# Patient Record
Sex: FEMALE | Race: OTHER | URBAN - METROPOLITAN AREA
[De-identification: names, ages, dates, MRNs, and addresses within clinical notes are randomized per-mention and may not be internally consistent; named-entity substitution may affect disease eponyms.]

---

## 2020-07-24 NOTE — PATIENT DISCUSSION
8 WEEKS POST PARTUM - WAIT UNTIL 3 MOS POST PARTUM FOR LASIK SX. THEREFORE, RTC FOR REPEAT CONSULT (TO DETERMINE STABILITY). UNDERSTANDS AS RELAXIN/ESTROGEN LEVELS DECREASE VISION MAY FLUCTUATE. REPORTS STABLE RX &gt; 3+ YEARS THROUGH BOTH PREGNANCIES.

## 2020-07-24 NOTE — PATIENT DISCUSSION
PATIENT CURRENTLY BREASTFEEDING -  DISC SMALL RISK OF TOPICAL DROPS ENTERING BLOODSTREAM. PT GIVEN OPTION TO DISC W/ OBGYN/PEDIATRICIAN PER HER DISCRETION.

## 2020-11-03 NOTE — PATIENT DISCUSSION
New Prescription: Zymaxid (gatifloxacin): drops: 0.5% 1 drop four times a day as directed into both eyes 11-

## 2020-11-03 NOTE — PATIENT DISCUSSION
New Prescription: prednisolone acetate (prednisolone acetate): drops,suspension: 1% 1 drop four times a day as directed into both eyes 11-

## 2020-11-12 NOTE — PATIENT DISCUSSION
Continue: prednisolone acetate (prednisolone acetate): drops,suspension: 1% 1 drop four times a day as directed into both eyes 11-

## 2020-11-12 NOTE — PATIENT DISCUSSION
DISC W PT THAT THE NEED FOR READING GLASSES USUALLY OCCURS AT AGE [age]. DISC EVEN POST LASIK, READING GLASSES WILL BE NEEDED WHEN THE TIME COMES.

## 2020-11-12 NOTE — PATIENT DISCUSSION
Continue: Zymaxid (gatifloxacin): drops: 0.5% 1 drop four times a day as directed into both eyes 11-

## 2022-03-29 ENCOUNTER — NEW PATIENT (OUTPATIENT)
Dept: URBAN - METROPOLITAN AREA CLINIC 9 | Facility: CLINIC | Age: 67
End: 2022-03-29

## 2022-03-29 DIAGNOSIS — H25.13: ICD-10-CM

## 2022-03-29 PROCEDURE — 99204 OFFICE O/P NEW MOD 45 MIN: CPT

## 2022-03-29 PROCEDURE — 92015 DETERMINE REFRACTIVE STATE: CPT

## 2022-03-29 PROCEDURE — 92136 OPHTHALMIC BIOMETRY: CPT

## 2022-03-29 ASSESSMENT — VISUAL ACUITY
OS_BCVA: 20/40
OS_SC: J7
OD_SC: 20/60
OS_PH: 20/40
OD_BCVA: 20/40
OD_SC: J3
OD_PH: 20/40
OS_SC: 20/60

## 2022-03-29 ASSESSMENT — TONOMETRY
OD_IOP_MMHG: 11
OS_IOP_MMHG: 13

## 2022-03-29 ASSESSMENT — KERATOMETRY
OS_K1POWER_DIOPTERS: 44.00
OD_K1POWER_DIOPTERS: 44.00
OS_K2POWER_DIOPTERS: 45.00
OS_AXISANGLE2_DEGREES: 163
OD_K2POWER_DIOPTERS: 44.75
OD_AXISANGLE_DEGREES: 106
OD_AXISANGLE2_DEGREES: 16
OS_AXISANGLE_DEGREES: 73

## 2022-03-29 NOTE — PATIENT DISCUSSION
MONOVISION: Monovision - Patient uses one eye to see at distance and the other eye to see at near. The patient states they have been a successful monovision patient or must demonstrate a successful mono-vision contact lens trial prior to cataract surgery. It was discussed that monovision is a compromise and vision will be limited during activities that require good depth perception to include driving at night and near tasks. The patient was informed that the range of focus at near with mono-vision after cataract surgery will be smaller due to the smaller depth of field with an artificial lens when compared to their natural lens even in the setting of a cataract. It was also explained that the distance vision in the near eye may be blurrier when compared to the distance vision in the near eye with a natural lens. As such, a small percentage of successful monovision patients prior to surgery may experience difficulty with monovision with an artificial lens implant after surgery possibly requiring glasses and/or contacts or an IOL exchange.

## 2022-03-29 NOTE — PATIENT DISCUSSION
Discussed the Light Adjustable Lens (JOY) with the patient. Told this lens gives the most flexibility regarding postoperative refractive outcome as it allows a non-surgical modification of the refractive error leading to less dependence on glasses. Patient made aware that this may require several visits and that the results depend on wearing UV protective glasses for about 3-5 weeks after surgery. Patient informed that the JOY may not be implanted on some occasions and need to be substituted by another lens if the pupil is found not to adequately dilate on the day of surgery. Patient informed that this is an elective out of pocket option not covered by payors. Also clearly discussed that there is no need to go with this option if wearing corrective glasses is acceptable.

## 2022-04-11 ENCOUNTER — ESTABLISHED PATIENT (OUTPATIENT)
Dept: URBAN - METROPOLITAN AREA CLINIC 14 | Facility: CLINIC | Age: 67
End: 2022-04-11

## 2022-04-11 DIAGNOSIS — H25.13: ICD-10-CM

## 2022-04-11 PROCEDURE — 99199ADVT ADVANCED VISION TESTING

## 2022-04-11 ASSESSMENT — KERATOMETRY
OD_K2POWER_DIOPTERS: 44.75
OD_AXISANGLE_DEGREES: 106
OS_AXISANGLE_DEGREES: 73
OD_K1POWER_DIOPTERS: 44.00
OS_K2POWER_DIOPTERS: 45.00
OS_K1POWER_DIOPTERS: 44.00
OS_AXISANGLE2_DEGREES: 163
OD_AXISANGLE2_DEGREES: 16

## 2022-04-11 NOTE — PATIENT DISCUSSION
Myopic Lasik. Discussed with the patient the predictability of visual outcome after cataract surgery is more difficult in previous refractive surgery patients. The patient is at greater risk for the need of enhancement to achieve desired visual outcome.

## 2022-06-08 ENCOUNTER — POST-OP (OUTPATIENT)
Dept: URBAN - METROPOLITAN AREA CLINIC 14 | Facility: CLINIC | Age: 67
End: 2022-06-08

## 2022-06-08 DIAGNOSIS — Z96.1: ICD-10-CM

## 2022-06-08 DIAGNOSIS — H25.11: ICD-10-CM

## 2022-06-08 PROCEDURE — 92136 OPHTHALMIC BIOMETRY: CPT

## 2022-06-08 PROCEDURE — 99024 POSTOP FOLLOW-UP VISIT: CPT

## 2022-06-08 ASSESSMENT — KERATOMETRY
OD_K2POWER_DIOPTERS: 44.75
OS_AXISANGLE_DEGREES: 73
OS_K2POWER_DIOPTERS: 45.00
OD_AXISANGLE_DEGREES: 106
OD_K1POWER_DIOPTERS: 44.00
OS_K1POWER_DIOPTERS: 44.00
OS_AXISANGLE2_DEGREES: 163
OD_AXISANGLE2_DEGREES: 16

## 2022-06-08 ASSESSMENT — VISUAL ACUITY
OS_PH: 20/70
OD_BCVA: 20/40
OS_SC: 20/200

## 2022-06-08 ASSESSMENT — TONOMETRY
OS_IOP_MMHG: 12
OD_IOP_MMHG: 16

## 2022-06-08 NOTE — PATIENT DISCUSSION
Pt reports flashing, retina is clear, nerve is healthy and pink. Start tears to help improve clarity through the cornea.

## 2022-06-08 NOTE — PATIENT DISCUSSION
Good postoperative appearance. Ok to hold off on surgery in the fellow eye if pt is not confident about proceeding at this time.

## 2022-06-13 ENCOUNTER — POST-OP (OUTPATIENT)
Dept: URBAN - METROPOLITAN AREA CLINIC 14 | Facility: CLINIC | Age: 67
End: 2022-06-13

## 2022-06-13 DIAGNOSIS — Z96.1: ICD-10-CM

## 2022-06-13 PROCEDURE — 99024 POSTOP FOLLOW-UP VISIT: CPT

## 2022-06-13 ASSESSMENT — VISUAL ACUITY
OD_PH: 20/40
OD_BCVA: 20/30
OD_SC: 20/60
OS_PH: 20/60
OS_SC: 20/100
OS_BCVA: 20/50

## 2022-06-13 NOTE — PATIENT DISCUSSION
DISCUSSED LUBRICATING OS AND HOLD OFF ON SURGERY FOR NOW. FOLLOW UP IN 2 WEEKS. DISCUSSED IF VISION DOES NOT IMPROVE DISCUSSED POSSIBLE IOL EXCHANGE FOR DISTANCE ONLY IOL.

## 2022-06-28 RX ORDER — LORAZEPAM 0.5 MG/1
TABLET ORAL
COMMUNITY
Start: 2022-04-19 | End: 2022-08-01 | Stop reason: SDUPTHER

## 2022-06-28 RX ORDER — PAROXETINE HYDROCHLORIDE 20 MG/1
TABLET, FILM COATED ORAL
COMMUNITY
Start: 2022-04-19 | End: 2022-09-13

## 2022-07-11 ENCOUNTER — POST-OP (OUTPATIENT)
Dept: URBAN - METROPOLITAN AREA CLINIC 14 | Facility: CLINIC | Age: 67
End: 2022-07-11

## 2022-07-11 DIAGNOSIS — Z96.1: ICD-10-CM

## 2022-07-11 PROCEDURE — 99024 POSTOP FOLLOW-UP VISIT: CPT

## 2022-07-11 ASSESSMENT — TONOMETRY
OD_IOP_MMHG: 13
OS_IOP_MMHG: 13

## 2022-07-11 ASSESSMENT — VISUAL ACUITY
OS_BCVA: 20/40
OD_SC: 20/50-2
OD_BCVA: 20/30
OS_SC: 20/100+1

## 2022-07-11 NOTE — PATIENT DISCUSSION
Vision gradually improving with time and lubrication. Discussed with the patient that the predictability of visual outcome after cataract surgery is more difficult in previous refractive surgery patients. The patient is at greater risk for the need of a lasik enhancement to achieve desired visual outcome. Pts vision did not improve with a -1.00 SCL OS in consideration of a lasik enhancement although pt reports minimal improvement. Vision is best corrected to 20/40.  Ruled out macular pathology on OCT Mac today.  IOL intolerance is likely the cause of her vision limitation. Recommend IOL exchange with a single vision IOL for distance. Pt would like to think about it and follow up in 2 weeks.

## 2022-07-27 ENCOUNTER — POST-OP (OUTPATIENT)
Dept: URBAN - METROPOLITAN AREA CLINIC 14 | Facility: CLINIC | Age: 67
End: 2022-07-27

## 2022-07-27 DIAGNOSIS — Z98.890: ICD-10-CM

## 2022-07-27 DIAGNOSIS — T85.29XA: ICD-10-CM

## 2022-07-27 DIAGNOSIS — Z96.1: ICD-10-CM

## 2022-07-27 DIAGNOSIS — H25.11: ICD-10-CM

## 2022-07-27 PROCEDURE — 99024 POSTOP FOLLOW-UP VISIT: CPT

## 2022-07-27 ASSESSMENT — VISUAL ACUITY
OD_SC: 20/40
OS_SC: 20/100
OS_PH: 20/60

## 2022-07-27 NOTE — PATIENT DISCUSSION
Placed -1.00 CL in OD, Patient to return next week for VA check and discussion. Patient demonstrated with purple glasses the near vision she would loose with an IOL exchange. Discussed RBA's of IOL exchange in the future.

## 2022-07-27 NOTE — PATIENT DISCUSSION
In addition to discussion of IOL exchange, the risks, benefits, alternatives and possible complications of vitrectomy/pars plana vitrectomy surgery were discussed in detail. The patient understands that it is not possible to anticipate all possible outcomes or complications from the surgery. Some potential risks include (but are not limited to): retinal detachment, glaucoma, choroidal detachment, pain, macular hole, decreased vision, loss of vision, and need for more surgery. The patient also understands that no guarantees can be made regarding the success of the planned surgery.

## 2022-07-29 PROBLEM — M16.11 PRIMARY OSTEOARTHRITIS OF RIGHT HIP: Status: ACTIVE | Noted: 2022-07-29

## 2022-07-29 PROBLEM — H26.9 CATARACT OF BOTH EYES: Status: ACTIVE | Noted: 2022-07-29

## 2022-07-29 PROBLEM — H93.13 TINNITUS OF BOTH EARS: Status: ACTIVE | Noted: 2022-07-29

## 2022-07-29 PROBLEM — I73.9 PVD (PERIPHERAL VASCULAR DISEASE) (HCC): Status: ACTIVE | Noted: 2022-07-29

## 2022-07-29 PROBLEM — H43.813 PVD (POSTERIOR VITREOUS DETACHMENT), BOTH EYES: Status: ACTIVE | Noted: 2022-07-29

## 2022-07-29 PROBLEM — E66.9 OBESITY (BMI 30.0-34.9): Status: ACTIVE | Noted: 2022-07-29

## 2022-07-29 PROBLEM — M19.90 OSTEOARTHRITIS: Status: ACTIVE | Noted: 2022-07-29

## 2022-07-29 PROBLEM — F41.9 ANXIETY: Status: ACTIVE | Noted: 2022-07-29

## 2022-07-29 PROBLEM — E78.5 HYPERLIPIDEMIA: Status: ACTIVE | Noted: 2022-07-29

## 2022-07-29 PROBLEM — G43.909 MIGRAINE WITHOUT STATUS MIGRAINOSUS, NOT INTRACTABLE: Status: ACTIVE | Noted: 2022-07-29

## 2022-08-10 ENCOUNTER — POST-OP (OUTPATIENT)
Dept: URBAN - METROPOLITAN AREA CLINIC 14 | Facility: CLINIC | Age: 67
End: 2022-08-10

## 2022-08-10 DIAGNOSIS — Z96.1: ICD-10-CM

## 2022-08-10 DIAGNOSIS — T85.29XD: ICD-10-CM

## 2022-08-10 PROCEDURE — 99024 POSTOP FOLLOW-UP VISIT: CPT

## 2022-08-10 ASSESSMENT — VISUAL ACUITY
OS_BCVA: 20/30-2
OS_PH: 20/60
OD_PH: 20/50
OD_SC: 20/70
OS_SC: 20/80

## 2022-08-10 ASSESSMENT — TONOMETRY
OS_IOP_MMHG: 14
OD_IOP_MMHG: 14

## 2022-08-10 NOTE — PATIENT DISCUSSION
Patient did not tolerate the CL in OS and took it out after 2 days of wearing it. She states she did not notice much improvement with the CL before she took it out. Patient is previous MV Lasik, OD near and OS distance. Mac OCT done today was normal. German done today showed irregularities from her Lasik. It was explained to the patient that the irregular astigmatism is causing the blurred VA, not the Vivity IOL and exchanging the IOL would not fix the blurred [place]. Hard CL over refraction done today and pt corrected to 20/30-2. Patient to return to see Dr. Monika Marie for Scleral/Hard CL fit and patient is to wear it for a day and if patient likes the vision in the Scleral/HCL then plan to work her up for german guided Chidi Henriquez.

## 2022-08-15 ENCOUNTER — POST-OP (OUTPATIENT)
Dept: URBAN - METROPOLITAN AREA CLINIC 14 | Facility: CLINIC | Age: 67
End: 2022-08-15

## 2022-08-15 DIAGNOSIS — Z96.1: ICD-10-CM

## 2022-08-15 DIAGNOSIS — T85.29XD: ICD-10-CM

## 2022-08-15 PROCEDURE — 99024 POSTOP FOLLOW-UP VISIT: CPT

## 2022-08-15 ASSESSMENT — VISUAL ACUITY
OS_SC: 20/80
OS_BCVA: 20/50

## 2022-08-15 NOTE — PATIENT DISCUSSION
Patient did not tolerate the CL in OS and took it out after 2 days of wearing it. She states she did not notice much improvement with the CL before she took it out. Patient is previous MV Lasik, OD near and OS distance. Mac OCT done today was normal. German done today showed irregularities from her Lasik. It was explained to the patient that the irregular astigmatism is causing the blurred VA, not the Vivity IOL and exchanging the IOL would not fix the blurred [place]. Hard CL over refraction done today and pt corrected to 20/30-2. Patient to return to see Dr. James Martinez for Scleral/Hard CL fit and patient is to wear it for a day and if patient likes the vision in the Scleral/HCL then plan to work her up for german guided Chidi Henriquez.

## 2022-08-15 NOTE — PATIENT DISCUSSION
RTC 1 day to see if complaints of near doubling resolved. Double resolved with +1.50 loose lens over OS, maintaining BCDVA OU to 20/40 OU.

## 2022-08-16 ENCOUNTER — POST-OP (OUTPATIENT)
Dept: URBAN - METROPOLITAN AREA CLINIC 14 | Facility: CLINIC | Age: 67
End: 2022-08-16

## 2022-08-16 DIAGNOSIS — Z96.1: ICD-10-CM

## 2022-08-16 DIAGNOSIS — T85.29XD: ICD-10-CM

## 2022-08-16 PROCEDURE — 99024 POSTOP FOLLOW-UP VISIT: CPT

## 2022-08-16 ASSESSMENT — VISUAL ACUITY
OD_SC: 20/70
OS_SC: J16
OS_CC: 20/80-2

## 2022-08-16 NOTE — PATIENT DISCUSSION
Doubling unresolved. Per KDS, [place] [address] OS or lens exchange OS still an option. Per KDS, lens exchange potential even after PRK. After extensive conversation, patient wishes to wait and think about her options.

## 2022-09-13 PROBLEM — I73.9 PERIPHERAL VASCULAR DISEASE (HCC): Status: ACTIVE | Noted: 2022-09-13

## 2022-09-13 PROBLEM — R29.898 SHOULDER WEAKNESS: Status: ACTIVE | Noted: 2022-09-13

## 2022-09-13 PROBLEM — M19.90 OSTEOARTHRITIS: Status: ACTIVE | Noted: 2022-09-13

## 2024-10-04 NOTE — PATIENT DISCUSSION
LARGE PUPILS - PATIENT WAS ADVISED OF THE INCREASED RISK OF NIGHT VISION SYMPTOMS ASSOCIATED WITH LARGER PUPILS. I DISCUSSED IN DETAIL THE POSSIBILITY OF GLARE, HALOS, AND STARBURSTS AROUND LIGHTS AT NIGHT THAT MAY AFFECT VISION. PATIENT WAS ADVISED THAT EYE DROPS  MAY HELP TO DECREASE SOME OF THESE SYMPTOMS BUT THAT THERE IS NO GUARANTEE. Parent